# Patient Record
Sex: FEMALE | Race: WHITE | Employment: FULL TIME | URBAN - METROPOLITAN AREA
[De-identification: names, ages, dates, MRNs, and addresses within clinical notes are randomized per-mention and may not be internally consistent; named-entity substitution may affect disease eponyms.]

---

## 2022-06-05 ENCOUNTER — HOSPITAL ENCOUNTER (EMERGENCY)
Age: 42
Discharge: HOME | End: 2022-06-06
Attending: EMERGENCY MEDICINE

## 2022-06-05 VITALS
DIASTOLIC BLOOD PRESSURE: 87 MMHG | RESPIRATION RATE: 17 BRPM | TEMPERATURE: 98.2 F | BODY MASS INDEX: 35.55 KG/M2 | HEART RATE: 89 BPM | OXYGEN SATURATION: 97 % | WEIGHT: 240 LBS | HEIGHT: 69 IN | SYSTOLIC BLOOD PRESSURE: 149 MMHG

## 2022-06-05 DIAGNOSIS — S81.812A LACERATION OF LEFT LOWER EXTREMITY, INITIAL ENCOUNTER: Primary | ICD-10-CM

## 2022-06-05 PROCEDURE — 99282 EMERGENCY DEPT VISIT SF MDM: CPT

## 2022-06-05 PROCEDURE — 12001 RPR S/N/AX/GEN/TRNK 2.5CM/<: CPT

## 2022-06-05 RX ORDER — LIDOCAINE HYDROCHLORIDE 10 MG/ML
10 INJECTION, SOLUTION EPIDURAL; INFILTRATION; INTRACAUDAL; PERINEURAL ONCE
Status: COMPLETED | OUTPATIENT
Start: 2022-06-06 | End: 2022-06-06

## 2022-06-06 PROCEDURE — 2500000003 HC RX 250 WO HCPCS: Performed by: EMERGENCY MEDICINE

## 2022-06-06 RX ADMIN — LIDOCAINE HYDROCHLORIDE 10 ML: 10 INJECTION, SOLUTION EPIDURAL; INFILTRATION; INTRACAUDAL; PERINEURAL at 01:51

## 2022-06-06 ASSESSMENT — ENCOUNTER SYMPTOMS
RHINORRHEA: 0
SHORTNESS OF BREATH: 0
VOMITING: 0
ABDOMINAL PAIN: 0
COUGH: 0
EYE DISCHARGE: 0
SORE THROAT: 0
BACK PAIN: 0
EYE PAIN: 0
NAUSEA: 0

## 2022-06-06 NOTE — ED NOTES
Patient has approximately quarter sized wound to left leg. Patient states she cleaned it with peroxide prior to coming in. Nonstick pad placed over wound, no active bleeding noted.       Shasha Duong RN  06/05/22 9590

## 2022-06-06 NOTE — ED PROVIDER NOTES
[address] ENCOUNTER      Pt Name: Robles Garza  MRN: [id number]  [place] 1980  Date of evaluation: 6/5/2022  Provider: Gloria Plummer MD    CHIEF COMPLAINT       Chief Complaint   Patient presents with    Leg Injury     horseback riding accident, branch pierced leg - 7pm tonight         HISTORY OF PRESENT ILLNESS      Sofya Martinez is a 43 y.o. female who presents to the emergency department  for   Chief Complaint   Patient presents with    Leg Injury     horseback riding accident, branch pierced leg - 7pm tonight       51-year-old female presents with injury to left lateral leg. She was riding horses today and became distracted and sustained an injury in the leg from a tree branch. She does state that she believes her tetanus is updated. She is not on any blood thinning medications. She denies any weakness or numbness in the leg. She presents for evaluation. She reports having a hard time getting the bleeding to stop. Bleeding is well controlled upon presentation the emergency department. She is ambulatory without difficulty. No other injuries. GCS of 15. Nursing Notes, Triage Notes & Vital Signs were reviewed. REVIEW OF SYSTEMS    (2-9 systems for level 4, 10 or more for level 5)     Review of Systems   Constitutional: Negative for chills and fever. HENT: Negative for congestion, rhinorrhea and sore throat. Eyes: Negative for pain and discharge. Respiratory: Negative for cough and shortness of breath. Cardiovascular: Negative for chest pain and palpitations. Gastrointestinal: Negative for abdominal pain, nausea and vomiting. Genitourinary: Negative for dysuria and flank pain. Musculoskeletal: Negative for back pain and neck pain. Skin: Positive for wound. Negative for pallor.    Neurological: Negative for dizziness, facial asymmetry, light-headedness, numbness and headaches. Psychiatric/Behavioral: Negative for confusion. Except as noted above the remainder of the review of systems was reviewed and negative. PAST MEDICAL HISTORY   No past medical history on file. Prior to Admission medications    Not on File        There is no problem list on file for this patient. SURGICAL HISTORY     No past surgical history on file. CURRENT MEDICATIONS       Previous Medications    No medications on file       ALLERGIES     Patient has no known allergies. FAMILY HISTORY     No family history on file. SOCIAL HISTORY       Social History     Socioeconomic History    Marital status:      Spouse name: Not on file    Number of children: Not on file    Years of education: Not on file    Highest education level: Not on file   Occupational History    Not on file   Tobacco Use    Smoking status: Never Smoker    Smokeless tobacco: Never Used   Substance and Sexual Activity    Alcohol use: Not Currently    Drug use: Not Currently    Sexual activity: Not on file   Other Topics Concern    Not on file   Social History Narrative    Not on file     Social Determinants of Health     Financial Resource Strain:     Difficulty of Paying Living Expenses: Not on file   Food Insecurity:     Worried About Running Out of Food in the Last Year: Not on file    Swapnil of Food in the Last Year: Not on file   Transportation Needs:     Lack of Transportation (Medical): Not on file    Lack of Transportation (Non-Medical):  Not on file   Physical Activity:     Days of Exercise per Week: Not on file    Minutes of Exercise per Session: Not on file   Stress:     Feeling of Stress : Not on file   Social Connections:     Frequency of Communication with Friends and Family: Not on file    Frequency of Social Gatherings with Friends and Family: Not on file    Attends Gnosticism Services: Not on file    Active Member of Clubs or Organizations: Not on file   [address] or Organization Meetings: Not on file    Marital Status: Not on file   Intimate Partner Violence:     Fear of Current or Ex-Partner: Not on file    Emotionally Abused: Not on file    Physically Abused: Not on file    Sexually Abused: Not on file   Housing Stability:     Unable to Pay for Housing in the Last Year: Not on file    Number of Rosas in the Last Year: Not on file    Unstable Housing in the Last Year: Not on file       SCREENINGS    [place] Coma Scale  Eye Opening: Spontaneous  Best Verbal Response: Oriented  Best Motor Response: Obeys commands  Milo Coma Scale Score: 15          PHYSICAL EXAM    (up to 7 for level 4, 8 or more for level 5)     ED Triage Vitals [06/05/22 2250]   BP Temp Temp Source Heart Rate Resp SpO2 Height Weight   (!) 149/87 98.2 °F (36.8 °C) Oral 89 17 97 % 5' 9\" (1.753 m) 240 lb (108.9 kg)       Physical Exam  Vitals reviewed. Constitutional:       Appearance: She is not ill-appearing or toxic-appearing. HENT:      Head: Normocephalic and atraumatic. Nose: No congestion or rhinorrhea. Mouth/Throat:      Mouth: Mucous membranes are moist.      Pharynx: No oropharyngeal exudate or posterior oropharyngeal erythema. Eyes:      General:         Right eye: No discharge. Left eye: No discharge. Extraocular Movements: Extraocular movements intact. Pupils: Pupils are equal, round, and reactive to light. Cardiovascular:      Rate and Rhythm: Normal rate. Pulses: Normal pulses. Heart sounds: No friction rub. No gallop. Pulmonary:      Effort: Pulmonary effort is normal. No respiratory distress. Breath sounds: Normal breath sounds. Chest:      Chest wall: No tenderness. Abdominal:      Palpations: Abdomen is soft. Tenderness: There is no abdominal tenderness. There is no guarding. Musculoskeletal:         General: Tenderness present. Cervical back: Normal range of motion and neck supple. No tenderness. Comments: LLE neurovascularly intact with 2+ dp/pt/femoral pulses   Lymphadenopathy:      Cervical: No cervical adenopathy. Skin:     General: Skin is warm. Capillary Refill: Capillary refill takes less than 2 seconds. Comments: 2cm wound on left lateral leg; no surronding erythema; bleeding controlled   Neurological:      General: No focal deficit present. Mental Status: She is alert and oriented to person, place, and time. DIAGNOSTIC RESULTS     Labs Reviewed - No data to display     RADIOLOGY:     Non-plain film images such as CT, Ultrasound and MRI are read by the radiologist. Plain radiographic images are visualized and preliminarily interpreted by the emergency physician. Interpretation per the Radiologist below, if available at the time of this note:    No orders to display         ED BEDSIDE ULTRASOUND:   Performed by ED Physician Eliceo Lomeli MD       LABS:  Labs Reviewed - No data to display    All other labs were within normal range or not returned as of this dictation. EMERGENCY DEPARTMENT COURSE and DIFFERENTIAL DIAGNOSIS/MDM:   Vitals:    Vitals:    06/05/22 2250   BP: (!) 149/87   Pulse: 89   Resp: 17   Temp: 98.2 °F (36.8 °C)   TempSrc: Oral   SpO2: 97%   Weight: 240 lb (108.9 kg)   Height: 5' 9\" (1.753 m)           MDM  Number of Diagnoses or Management Options  Laceration of left lower extremity, initial encounter  Diagnosis management comments: 77-year-old female presents with laceration to left lateral leg. She was horse riding when she being distracted and was injured by a tree branch. She is on any blood thinning medications. Denies any numbness or weakness in the leg. She did try cleaning it at home. She presents for evaluation. Who is her tetanus is up-to-date. She presents with elevated blood pressure. Vitals otherwise unremarkable. On exam she is able to 2 cm laceration. No foreign body noted.   No significant surrounding erythema. Bleeding controlled upon presentation. Her wound is cleaned. Wound is repaired with several loose sutures. She is given wound care instructions. She will follow-up outpatient for further evaluation and management and suture removal.  She is discharged ambulatory in stable condition with return precautions. -  Patient seen and evaluated in the emergency department. -  Triage and nursing notes reviewed and incorporated. -  Old chart records reviewed and incorporated. -  Work-up included:  See above  -  Results discussed with patient. CONSULTS:  None    PROCEDURES:  None performed unless otherwise noted below     Lac Repair    Date/Time: 6/6/2022 1:22 AM  Performed by: Jeffrey Moran MD  Authorized by: Jeffrey Moran MD     Consent:     Consent obtained:  Verbal    Consent given by:  Patient    Risks discussed:  Poor wound healing and poor cosmetic result    Alternatives discussed:  Observation  Anesthesia (see MAR for exact dosages):      Anesthesia method:  Local infiltration    Local anesthetic:  Lidocaine 1% w/o epi  Laceration details:     Location:  Leg    Leg location:  L upper leg    Length (cm):  2  Repair type:     Repair type:  Simple  Pre-procedure details:     Preparation:  Patient was prepped and draped in usual sterile fashion  Exploration:     Hemostasis achieved with:  Direct pressure    Wound extent: no nerve damage noted, no tendon damage noted and no vascular damage noted      Contaminated: no    Treatment:     Area cleansed with:  Hibiclens    Amount of cleaning:  Standard    Irrigation solution:  Sterile saline    Irrigation method:  Syringe    Visualized foreign bodies/material removed: no    Skin repair:     Repair method:  Sutures    Suture size:  4-0    Suture material:  Prolene    Suture technique:  Simple interrupted    Number of sutures:  5  Approximation:     Approximation:  Loose  Post-procedure details:     Dressing:  Non-adherent dressing            FINAL IMPRESSION      1. Laceration of left lower extremity, initial encounter          DISPOSITION/PLAN   DISPOSITION Decision To Discharge 06/06/2022 01:52:50 AM      PATIENT REFERRED TO:  [facility] [address]  [facility] [address] [phone]  In 1 week        DISCHARGE MEDICATIONS:  New Prescriptions    No medications on file       ED Provider Disposition Time  DISPOSITION Decision To Discharge 06/06/2022 01:52:50 AM      Appropriate personal protective equipment was worn during the patient's evaluation. These included surgical, eye protection, surgical mask or in 95 respirator and gloves. The patient was also placed in a surgical mask for the prevention of possible spread of respiratory viral illnesses. The Patient was instructed to read the package inserts with any medication that was prescribed. Major potential reactions and medication interactions were discussed. The Patient understands that there are numerous possible adverse reactions not covered. The patient was also instructed to arrange follow-up with his or her primary care provider for review of any pending labwork or incidental findings on any radiology results that were obtained. All efforts were made to discuss any incidental findings that require further monitoring. Controlled Substances Monitoring:     No flowsheet data found.     (Please note that portions of this note were completed with a voice recognition program.  Efforts were made to edit the dictations but occasionally words are mis-transcribed.)    Francesca Grubbs MD (electronically signed)  Attending Emergency Physician           Francesca Grubbs MD  06/06/22 4328

## 2022-11-12 ENCOUNTER — HOSPITAL ENCOUNTER (EMERGENCY)
Age: 42
Discharge: HOME | End: 2022-11-12

## 2022-11-12 VITALS
TEMPERATURE: 98.1 F | HEIGHT: 71 IN | OXYGEN SATURATION: 100 % | DIASTOLIC BLOOD PRESSURE: 100 MMHG | HEART RATE: 81 BPM | BODY MASS INDEX: 33.6 KG/M2 | WEIGHT: 240 LBS | SYSTOLIC BLOOD PRESSURE: 147 MMHG | RESPIRATION RATE: 18 BRPM

## 2022-11-12 DIAGNOSIS — S01.01XA LACERATION OF SCALP, INITIAL ENCOUNTER: ICD-10-CM

## 2022-11-12 DIAGNOSIS — S09.90XA INJURY OF HEAD, INITIAL ENCOUNTER: Primary | ICD-10-CM

## 2022-11-12 PROCEDURE — 90715 TDAP VACCINE 7 YRS/> IM: CPT | Performed by: NURSE PRACTITIONER

## 2022-11-12 PROCEDURE — 12001 RPR S/N/AX/GEN/TRNK 2.5CM/<: CPT

## 2022-11-12 PROCEDURE — 6360000002 HC RX W HCPCS: Performed by: NURSE PRACTITIONER

## 2022-11-12 PROCEDURE — 99284 EMERGENCY DEPT VISIT MOD MDM: CPT

## 2022-11-12 PROCEDURE — 90471 IMMUNIZATION ADMIN: CPT | Performed by: NURSE PRACTITIONER

## 2022-11-12 RX ADMIN — TETANUS TOXOID, REDUCED DIPHTHERIA TOXOID AND ACELLULAR PERTUSSIS VACCINE, ADSORBED 0.5 ML: 5; 2.5; 8; 8; 2.5 SUSPENSION INTRAMUSCULAR at 17:31

## 2022-11-12 ASSESSMENT — PAIN DESCRIPTION - LOCATION: LOCATION: HEAD

## 2022-11-12 ASSESSMENT — PAIN DESCRIPTION - FREQUENCY: FREQUENCY: CONTINUOUS

## 2022-11-12 ASSESSMENT — PAIN SCALES - GENERAL
PAINLEVEL_OUTOF10: 7
PAINLEVEL_OUTOF10: 7

## 2022-11-12 ASSESSMENT — PAIN - FUNCTIONAL ASSESSMENT: PAIN_FUNCTIONAL_ASSESSMENT: 0-10

## 2022-11-12 ASSESSMENT — PAIN DESCRIPTION - ORIENTATION: ORIENTATION: LEFT

## 2022-11-12 ASSESSMENT — PAIN DESCRIPTION - PAIN TYPE: TYPE: ACUTE PAIN

## 2022-11-12 NOTE — Clinical Note
Carla Hill was seen and treated in our emergency department on 11/12/2022. She may return to work on 11/13/2022. If you have any questions or concerns, please don't hesitate to call.       Tina Schilder, APRN - CNP

## 2022-11-12 NOTE — ED TRIAGE NOTES
Patient presents to the ER for a head injury. Patient states she was cutting a fence under tension when the farm gate fell onto her head. Patient has a laceration to the left side of her head in her hairline. Patient denies LOC or blood thinners.

## 2022-11-12 NOTE — DISCHARGE INSTRUCTIONS
Keep wound clean and dry. Wash with mild soap and water daily and as needed. Apply a thin layer of antibiotic ointment twice daily. Change bandage daily and as needed. Watch for signs of infection including redness, swelling, purulent drainage, warmth, fevers, or streaking. If any of these symptoms present, follow up with your primary care provider or return here for re-evaluation. Suture/staple removal in 7 days either at your primary care provider or here. Follow up with your primary care provider, within a week, call to schedule an appointment. Return to the emergency department with worsening symptoms. Alternate ibuprofen as directed for pain. Follow preprinted head injury instructions. Return to the emergency department with worsening symptoms. You do not have a primary care provider, I have provided you information of the provider accepting new patients.

## 2022-11-12 NOTE — DISCHARGE INSTR - COC
Continuity of Care Form    Patient Name: Vernia Dubin   :  1980  MRN:  [id number]    Admit date:  2022  Discharge date:  ***    Code Status Order: No Order   Advance Directives:     Admitting Physician:  No admitting provider for patient encounter. PCP: No primary care provider on file. Discharging Nurse: [facility] Unit/Room#: ED06/ED-06  Discharging Unit Phone Number: ***    Emergency Contact:   No emergency contact information on file. Past Surgical History:  History reviewed. No pertinent surgical history. Immunization History: There is no immunization history on file for this patient. Active Problems: There is no problem list on file for this patient. Isolation/Infection:   Isolation            No Isolation          Patient Infection Status       None to display            Nurse Assessment:  Last Vital Signs: BP (!) 134/91   Pulse 93   Temp 98.1 °F (36.7 °C) (Oral)   Resp 18   Ht 5' 11\" (1.803 m)   Wt 240 lb (108.9 kg)   LMP 2022 (Exact Date)   SpO2 99%   BMI 33.47 kg/m²     Last documented pain score (0-10 scale): Pain Level: 7  Last Weight:   Wt Readings from Last 1 Encounters:   22 240 lb (108.9 kg)     Mental Status:  {IP PT MENTAL STATUS:59141}    IV Access:  { EVANGELIST IV ACCESS:086041267}    Nursing Mobility/ADLs:  Walking   {CHP DME GKXH:192435878}  Transfer  {CHP DME DTDZ:586312723}  Bathing  {CHP DME FLPD:605508468}  Dressing  {CHP DME ZYOI:431476495}  Toileting  {CHP DME NDYR:244673897}  Feeding  {CHP DME BZEK:836538050}  Med Admin  {P DME UYEP:343297455}  Med Delivery   { EVANGELIST MED Delivery:855978553}    Wound Care Documentation and Therapy:        Elimination:  Continence:    Bowel: {YES / NZ:33888}  Bladder: {YES / DX:34940}  Urinary Catheter: {Urinary Catheter:245234188}   Colostomy/Ileostomy/Ileal Conduit: {YES / IQ:33553}       Date of Last BM: ***  No intake or output data in the 24 hours ending 22 1725  No intake/output data recorded.     Safety Concerns:     [address] [facility] Safety Concerns:808996555}    Impairments/Disabilities:      [address] [facility] Impairments/Disabilities:716802164}    Nutrition Therapy:  Current Nutrition Therapy:   [address] [facility] Diet List:596067859}    Routes of Feeding: {CHP DME Other Feedings:477178315}  Liquids: {Slp liquid thickness:59083}  Daily Fluid Restriction: {CHP DME Yes amt example:431118308}  Last Modified Barium Swallow with Video (Video Swallowing Test): {Done Not Done TTQD:531858599}    Treatments at the Time of Hospital Discharge:   Respiratory Treatments: ***  Oxygen Therapy:  {Therapy; copd oxygen:68837}  Ventilator:    {MH CC Vent AMVB:971452521}    Rehab Therapies: {THERAPEUTIC INTERVENTION:0435070311}  Weight Bearing Status/Restrictions: [address] [facility] Weight Bearin}  Other Medical Equipment (for information only, NOT a DME order):  {EQUIPMENT:756566678}  Other Treatments: ***    Patient's personal belongings (please select all that are sent with patient):  {P DME Belongings:387651015}    RN SIGNATURE:  {Esignature:450490296}    CASE MANAGEMENT/SOCIAL WORK SECTION    Inpatient Status Date: ***    Readmission Risk Assessment Score:  Readmission Risk              Risk of Unplanned Readmission:  0           Discharging to Facility/ Agency   Name:   Address:  Phone:  Fax:    Dialysis Facility (if applicable)   Name:  Address:  Dialysis Schedule:  Phone:  Fax:    / signature: {Esignature:948575496}    PHYSICIAN SECTION    Prognosis: {Prognosis:3777523766}    Condition at Discharge: [address] Patient Condition:665043225}    Rehab Potential (if transferring to Rehab): {Prognosis:2986573992}    Recommended Labs or Other Treatments After Discharge: ***    Physician Certification: I certify the above information and transfer of Vernia Dubin  is necessary for the continuing treatment of the diagnosis listed and that she requires {Admit to Appropriate Level of Care:56908} for {GREATER/LESS:406768047} 30 days.      Update Admission H&P: {CHP DME Changes in EEST}    PHYSICIAN SIGNATURE:  {Esignature:433448495}

## 2022-11-12 NOTE — ED PROVIDER NOTES
EMERGENCY DEPARTMENT ENCOUNTER        PCP: No primary care provider on file. CHIEF COMPLAINT    Chief Complaint   Patient presents with    Head Injury     Hit in head with a farm gate         This patient was not evaluated by the attending physician. I have independently evaluated this patient . HPI    Lia Cabral is a 43 y.o. female who presents with with complaints of a scalp laceration. The patient states she was unlocking a gate and the gate flipped up striking her on the top of the head. She denies loss of consciousness. No nausea, vomiting, or ataxia. She is complaining of a mild aching headache that is constant. Nothing has alleviated her symptoms, palpation exacerbates her symptoms. Unknown tetanus status. She denies any neck pain or other injuries. REVIEW OF SYSTEMS    General:   Denies symptoms preceding injury. Skin: + Laceration. SEE HPI  Musculoskeletal:  No distal numbness, tingling. No obvious tendon or motor deficits. Denies any other musculoskeletal injuries or skin trauma. All other review of systems are negative  See HPI and nursing notes for additional information     [address]    History reviewed. No pertinent past medical history. History reviewed. No pertinent surgical history. CURRENT MEDICATIONS        ALLERGIES    No Known Allergies    SOCIAL & FAMILY HISTORY    Social History     Socioeconomic History    Marital status:      Spouse name: None    Number of children: None    Years of education: None    Highest education level: None   Tobacco Use    Smoking status: Never    Smokeless tobacco: Never   Substance and Sexual Activity    Alcohol use: Yes     Comment: occ    Drug use: Not Currently    Sexual activity: Yes     Partners: Male     History reviewed. No pertinent family history.         PHYSICAL EXAM    VITAL SIGNS: BP (!) 134/91   Pulse 93   Temp 98.1 °F (36.7 °C) (Oral)   Resp 18   Ht 5' 11\" (1.803 m) Wt 240 lb (108.9 kg)   LMP 11/11/2022 (Exact Date)   SpO2 99%   BMI 33.47 kg/m²   Constitutional:  Well developed, Appears comfortable  HEENT:  Normocephalic,   PERRL, EOMI. Musculoskeletal:  No gross deformities. No motor deficits. Distal sensation and capillary refill intact. Vascular: Distal pulses and capillary refill intact. Integument:    The top of the scalp there is a laceration measuring approximately 1 cm centimeters in length    No obvious foreign body on initial inspection. Distal sensation, capillary refill intact. See below for further details. Neurologic:  Awake and alert, normal flow of speech. CN 2-12 intact. Psychiatric: Cooperative, pleasant affect        RADIOLOGY/PROCEDURES    Patient declined head CT.    ________________________________________________________________________       Procedure Note - RANDOLPH Burch - CNP, TONY      Laceration Repair Procedure Note    Indication: Skin Laceration -scalp    Procedure:   - Procedure explained, including risks and benefits explained to the patient who expressed understanding. All questions were answered. Verbal consent obtained. - The Wound was prepped and draped in the usual sterile fashion using Betadine and sterile saline.  -Was offered lidocaine cane, however declined. - Wound was explored to it's depth:    no foreign bodies. - Wound was irrigated with copious amounts of sterile saline and mechanically debrided utilizing sterile gauze. - The laceration was Closed with #2 staples   - Hemostasis and good cosmesis was achieved. Blood loss minimal.  - The wound area was then dressed with Sterile nonstick dressing, sterile gauze, and tape. - Patient tolerated procedure well without complications.         Total repaired wound length: 1 cm  ________________________________________________________________________          ED COURSE & MEDICAL DECISION MAKING      77-year-old female presents emergency department with complaints of a laceration on the top of her head after [place] She denied losing consciousness. Nausea or vomiting. Is acting appropriately per . I have low suspicion for intracranial hemorrhage. She was offered a CT scan, however declined. She was sent home with head injury instructions. I discussed possibility of infection, retained foreign body, tendon injury, nerve injury. Wound care instructions discussed with patient today. Wound check in 2-3 days. Suture/Staple removal in 7  days. (discussed today). Tetanus vaccination updated. Return to emergency Department precautions were discussed in detail with patient who understands and agrees. Vital signs and nursing notes reviewed during ED course. All pertinent Lab data and radiographic results reviewed with patient at bedside. The patient and/or the family were informed of the results of any tests/labs/imaging, the treatment plan, and time was allotted to answer questions. Clinical  IMPRESSION    1. Injury of head, initial encounter    2. Laceration of scalp, initial encounter              Comment: Please note this report has been produced using speech recognition software and may contain errors related to that system including errors in grammar, punctuation, and spelling, as well as words and phrases that may be inappropriate. If there are any questions or concerns please feel free to contact the dictating provider for clarification.       RANDOLPH Rahman - EVERETTE  11/12/22 3939